# Patient Record
Sex: MALE
[De-identification: names, ages, dates, MRNs, and addresses within clinical notes are randomized per-mention and may not be internally consistent; named-entity substitution may affect disease eponyms.]

---

## 2023-10-31 PROBLEM — Z00.00 ENCOUNTER FOR PREVENTIVE HEALTH EXAMINATION: Status: ACTIVE | Noted: 2023-10-31

## 2023-11-10 ENCOUNTER — APPOINTMENT (OUTPATIENT)
Dept: RADIATION ONCOLOGY | Facility: CLINIC | Age: 58
End: 2023-11-10
Payer: MEDICAID

## 2023-11-10 VITALS
BODY MASS INDEX: 46.65 KG/M2 | SYSTOLIC BLOOD PRESSURE: 132 MMHG | HEIGHT: 69 IN | DIASTOLIC BLOOD PRESSURE: 85 MMHG | OXYGEN SATURATION: 96 % | HEART RATE: 93 BPM | TEMPERATURE: 96.4 F | RESPIRATION RATE: 18 BRPM | WEIGHT: 315 LBS

## 2023-11-10 DIAGNOSIS — I10 ESSENTIAL (PRIMARY) HYPERTENSION: ICD-10-CM

## 2023-11-10 DIAGNOSIS — I48.91 UNSPECIFIED ATRIAL FIBRILLATION: ICD-10-CM

## 2023-11-10 DIAGNOSIS — Z92.21 PERSONAL HISTORY OF ANTINEOPLASTIC CHEMOTHERAPY: ICD-10-CM

## 2023-11-10 PROCEDURE — 99204 OFFICE O/P NEW MOD 45 MIN: CPT | Mod: GC,25

## 2023-11-10 RX ORDER — PROCHLORPERAZINE MALEATE 5 MG/1
5 TABLET ORAL
Refills: 0 | Status: ACTIVE | COMMUNITY
Start: 2023-11-10

## 2023-11-10 RX ORDER — CAPECITABINE 500 MG/1
500 TABLET ORAL
Refills: 0 | Status: ACTIVE | COMMUNITY
Start: 2023-11-10

## 2023-11-21 PROBLEM — I10 HYPERTENSION: Status: ACTIVE | Noted: 2023-11-10

## 2023-11-21 PROBLEM — I48.91 ATRIAL FIBRILLATION: Status: ACTIVE | Noted: 2023-11-21

## 2024-02-07 ENCOUNTER — OUTPATIENT (OUTPATIENT)
Dept: OUTPATIENT SERVICES | Facility: HOSPITAL | Age: 59
LOS: 1 days | Discharge: HOME | End: 2024-02-07
Payer: MEDICAID

## 2024-02-12 ENCOUNTER — APPOINTMENT (OUTPATIENT)
Dept: RADIATION ONCOLOGY | Facility: CLINIC | Age: 59
End: 2024-02-12
Payer: MEDICAID

## 2024-02-12 VITALS
HEIGHT: 69 IN | DIASTOLIC BLOOD PRESSURE: 81 MMHG | BODY MASS INDEX: 46.33 KG/M2 | TEMPERATURE: 95.9 F | OXYGEN SATURATION: 96 % | WEIGHT: 312.84 LBS | SYSTOLIC BLOOD PRESSURE: 121 MMHG | RESPIRATION RATE: 20 BRPM | HEART RATE: 105 BPM

## 2024-02-12 PROCEDURE — 99215 OFFICE O/P EST HI 40 MIN: CPT | Mod: 25,GC

## 2024-02-12 RX ORDER — LOSARTAN POTASSIUM AND HYDROCHLOROTHIAZIDE 25; 100 MG/1; MG/1
100-25 TABLET ORAL DAILY
Refills: 0 | Status: DISCONTINUED | COMMUNITY
Start: 2023-11-10 | End: 2024-02-12

## 2024-02-12 RX ORDER — APIXABAN 5 MG/1
5 TABLET, FILM COATED ORAL
Qty: 30 | Refills: 0 | Status: ACTIVE | COMMUNITY
Start: 2024-02-12

## 2024-02-12 NOTE — HISTORY OF PRESENT ILLNESS
[FreeTextEntry1] : Mr. Zohaib Valencia is a 57 yo M morbidly obese, Afib (on Eliquis), HTN and metastatic rectal adenocarcinoma with disease to lungs, atleast cM1a, Stage HUEY s/p Xeloda, Oxaliplatin and Avastin for 6 cycles at [facility] with positive response. Patient re-presents to discuss radiation therapy.  Brief Oncological History: He had circumferential 9cm rectal mass with extension into the anal canal on screening colonoscopy 5/18/23 initial pathology benign.  6/13/23 - CT c/A/P showed bibasilar lung nodules suspicious for metastases, rectal fullness concerning for underlying lesion and left inferior para-aortic lymph nodes.  6/16/2023 - MRI Pelvis confirmed large sessile mass of the rectum without transmural invasion and centrally necrotic mildly enlarged LNs and external iliac lymph node.  8/11/23 - PET CT showing low grade metabolic activity of the rectal lesion. Prominent mesorectal -2cm, right external iliac - 1.6cm, celiac LNs - 2.2cm, portal caval LNs - 1.cm. Multiple lung nodules, largest RLL, measuring 1.9x1.5cm.  8/30/23 - Colonoscopy showed a 9cm circumferential mass 0 to 9cm from the anal verge. Biopsies showing multiple superficial fragments of villotubular adenoma though superficial biopsies are likely not representative.  9/20/23 - RLL biopsy showing metastatic well differentiated adenocarcinoma with extensive necrosis, consistent with colonic primary. Her2 negative, MSH intact.  10/21/23 - CT C/A/P showing increased number of innumerable pulmonary nodules. Soft tissue rectal mass measuring 3.1 x 3.3 x 2.9cm.  11/9/23 - Another rectal biopsy noted invasive adenocarcinoma arising in tubulovillous adenoma, well to modertately dififerentiated.  1/30/24 - CT C/A/P showing decreased size of rectal mass and decreased size and number of lung nodules representing a positive response to chemotherapy. The RLL mass is now 1.3x1.2 cm (previously 1.7x1.8cm. Rectal mass previousy 3.3cm now 2.5cm - limited LN response.  Per outside documentation from [facility], has completed 6 cycles of Xeloda 2500mg BID x14 days, Oxaliplatin 130mg IV and Avastin 7/5mg IV [id number]. Was seen at [facility] on 11/10, but at the time given extent of metastatic disease, discussed chemotherapy first.   2/12/23 - Today for re-evaluation. He is doing well, tolerating chemotherapy with neuropathy noted. He notes fatigue from chemotherapy, and no appetite, has lost 40 pounds since starting he notes. Overall taking 5-6 Ensures daily and trying to hydrate as much as possible. Daily soft normal BM, no diarrhea or constipation, no blood. No new SOB/ CP. Noted 1 episode of sudden onsent SOB that self resolved and recent fall 3 weeks ago without LOC, or trauma.

## 2024-02-12 NOTE — HISTORY OF PRESENT ILLNESS
[FreeTextEntry1] : Mr. Zohaib Valencia is a 59 yo M morbidly obese, Afib (on Eliquis), HTN and metastatic rectal adenocarcinoma with disease to lungs, atleast cM1a, Stage HUEY s/p Xeloda, Oxaliplatin and Avastin for 6 cycles at [facility] with positive response. Patient re-presents to discuss radiation therapy.  Brief Oncological History: He had circumferential 9cm rectal mass with extension into the anal canal on screening colonoscopy 5/18/23 initial pathology benign.  6/13/23 - CT c/A/P showed bibasilar lung nodules suspicious for metastases, rectal fullness concerning for underlying lesion and left inferior para-aortic lymph nodes.  6/16/2023 - MRI Pelvis confirmed large sessile mass of the rectum without transmural invasion and centrally necrotic mildly enlarged LNs and external iliac lymph node.  8/11/23 - PET CT showing low grade metabolic activity of the rectal lesion. Prominent mesorectal -2cm, right external iliac - 1.6cm, celiac LNs - 2.2cm, portal caval LNs - 1.cm. Multiple lung nodules, largest RLL, measuring 1.9x1.5cm.  8/30/23 - Colonoscopy showed a 9cm circumferential mass 0 to 9cm from the anal verge. Biopsies showing multiple superficial fragments of villotubular adenoma though superficial biopsies are likely not representative.  9/20/23 - RLL biopsy showing metastatic well differentiated adenocarcinoma with extensive necrosis, consistent with colonic primary. Her2 negative, MSH intact.  10/21/23 - CT C/A/P showing increased number of innumerable pulmonary nodules. Soft tissue rectal mass measuring 3.1 x 3.3 x 2.9cm.  11/9/23 - Another rectal biopsy noted invasive adenocarcinoma arising in tubulovillous adenoma, well to modertately dififerentiated.  1/30/24 - CT C/A/P showing decreased size of rectal mass and decreased size and number of lung nodules representing a positive response to chemotherapy. The RLL mass is now 1.3x1.2 cm (previously 1.7x1.8cm. Rectal mass previousy 3.3cm now 2.5cm - limited LN response.  Per outside documentation from [facility], has completed 6 cycles of Xeloda 2500mg BID x14 days, Oxaliplatin 130mg IV and Avastin 7/5mg IV [id number]. Was seen at [facility] on 11/10, but at the time given extent of metastatic disease, discussed chemotherapy first.   2/12/23 - Today for re-evaluation. He is doing well, tolerating chemotherapy with neuropathy noted. He notes fatigue from chemotherapy, and no appetite, has lost 40 pounds since starting he notes. Overall taking 5-6 Ensures daily and trying to hydrate as much as possible. Daily soft normal BM, no diarrhea or constipation, no blood. No new SOB/ CP. Noted 1 episode of sudden onsent SOB that self resolved and recent fall 3 weeks ago without LOC, or trauma.

## 2024-02-12 NOTE — PHYSICAL EXAM
[Sclera] : the sclera and conjunctiva were normal [Outer Ear] : the ears and nose were normal in appearance [] : no respiratory distress [Heart Rate And Rhythm] : heart rate and rhythm were normal [Nondistended] : nondistended [Abdomen Tenderness] : non-tender [Normal] : no palpable adenopathy [Musculoskeletal - Swelling] : no joint swelling [No Focal Deficits] : no focal deficits [Skin Color & Pigmentation] : normal skin color and pigmentation [Oriented To Time, Place, And Person] : oriented to person, place, and time

## 2024-02-12 NOTE — REVIEW OF SYSTEMS
[Negative] : Gastrointestinal [Fever] : no fever [Chills] : no chills [Chest Pain] : no chest pain [Palpitations] : no palpitations [Abdominal Pain] : no abdominal pain [Vomiting] : no vomiting [Constipation] : no constipation [Diarrhea] : no diarrhea [de-identified] : Neuropathy leading to fall per aptient.

## 2024-02-12 NOTE — REVIEW OF SYSTEMS
[Negative] : Gastrointestinal [Fever] : no fever [Chills] : no chills [Chest Pain] : no chest pain [Palpitations] : no palpitations [Abdominal Pain] : no abdominal pain [Vomiting] : no vomiting [Constipation] : no constipation [Diarrhea] : no diarrhea [de-identified] : Neuropathy leading to fall per aptient.

## 2024-02-12 NOTE — PHYSICAL EXAM
[Sclera] : the sclera and conjunctiva were normal [Outer Ear] : the ears and nose were normal in appearance [] : no respiratory distress [Heart Rate And Rhythm] : heart rate and rhythm were normal [Nondistended] : nondistended [Abdomen Tenderness] : non-tender [Normal] : no palpable adenopathy [Musculoskeletal - Swelling] : no joint swelling [Skin Color & Pigmentation] : normal skin color and pigmentation [No Focal Deficits] : no focal deficits [Oriented To Time, Place, And Person] : oriented to person, place, and time

## 2024-03-01 ENCOUNTER — NON-APPOINTMENT (OUTPATIENT)
Age: 59
End: 2024-03-01

## 2024-04-22 ENCOUNTER — APPOINTMENT (OUTPATIENT)
Dept: RADIATION ONCOLOGY | Facility: CLINIC | Age: 59
End: 2024-04-22
Payer: MEDICAID

## 2024-04-22 VITALS
SYSTOLIC BLOOD PRESSURE: 133 MMHG | HEART RATE: 88 BPM | RESPIRATION RATE: 20 BRPM | BODY MASS INDEX: 43.41 KG/M2 | TEMPERATURE: 95.9 F | WEIGHT: 293.98 LBS | DIASTOLIC BLOOD PRESSURE: 87 MMHG | OXYGEN SATURATION: 96 %

## 2024-04-22 DIAGNOSIS — R91.8 OTHER NONSPECIFIC ABNORMAL FINDING OF LUNG FIELD: ICD-10-CM

## 2024-04-22 PROCEDURE — 99215 OFFICE O/P EST HI 40 MIN: CPT | Mod: 25

## 2024-04-25 NOTE — PHYSICAL EXAM
[General Appearance - Alert] : alert [General Appearance - In No Acute Distress] : in no acute distress [Obese] : obese [Sclera] : the sclera and conjunctiva were normal [Extraocular Movements] : extraocular movements were intact [Outer Ear] : the ears and nose were normal in appearance [Hearing Threshold Finger Rub Not [place]] : hearing was normal [] : no respiratory distress [Exaggerated Use Of Accessory Muscles For Inspiration] : no accessory muscle use [Arterial Pulses Normal] : the arterial pulses were normal [Edema] : no peripheral edema present [Abdomen Soft] : soft [Nondistended] : nondistended [Abdomen Tenderness] : non-tender [Nail Clubbing] : no clubbing  or cyanosis of the fingernails [Normal] : oriented to person, place and time, the affect was normal, the mood was normal and not anxious

## 2024-05-07 DIAGNOSIS — C20 MALIGNANT NEOPLASM OF RECTUM: ICD-10-CM

## 2024-05-08 ENCOUNTER — APPOINTMENT (OUTPATIENT)
Dept: RADIATION ONCOLOGY | Facility: CLINIC | Age: 59
End: 2024-05-08
Payer: MEDICAID

## 2024-05-08 ENCOUNTER — NON-APPOINTMENT (OUTPATIENT)
Age: 59
End: 2024-05-08

## 2024-05-08 VITALS
OXYGEN SATURATION: 94 % | SYSTOLIC BLOOD PRESSURE: 144 MMHG | WEIGHT: 295.97 LBS | DIASTOLIC BLOOD PRESSURE: 98 MMHG | HEART RATE: 82 BPM | RESPIRATION RATE: 16 BRPM | BODY MASS INDEX: 43.71 KG/M2 | TEMPERATURE: 97.2 F

## 2024-05-08 DIAGNOSIS — C20 MALIGNANT NEOPLASM OF RECTUM: ICD-10-CM

## 2024-05-08 PROCEDURE — 77470 SPECIAL RADIATION TREATMENT: CPT | Mod: 26

## 2024-05-08 PROCEDURE — 77263 THER RADIOLOGY TX PLNG CPLX: CPT

## 2024-05-08 PROCEDURE — 77290 THER RAD SIMULAJ FIELD CPLX: CPT | Mod: 26

## 2024-05-08 PROCEDURE — 99213 OFFICE O/P EST LOW 20 MIN: CPT | Mod: GC

## 2024-05-08 PROCEDURE — 77333 RADIATION TREATMENT AID(S): CPT | Mod: 26

## 2024-05-08 NOTE — REVIEW OF SYSTEMS
[Anal Pain: Grade 0] : Anal Pain: Grade 0 [Diarrhea: Grade 0] : Diarrhea: Grade 0 [Fecal Incontinence: Grade 0] : Fecal Incontinence: Grade 0 [Nausea: Grade 0] : Nausea: Grade 0 [Rectal Pain: Grade 0] : Rectal Pain: Grade 0 [Vomiting: Grade 0] : Vomiting: Grade 0 [Urinary Frequency: Grade 1 - Present] : Urinary Frequency: Grade 1 - Present [Peripheral Sensory Neuropathy: Grade 1 - Asymptomatic; loss of deep tendon reflexes or paresthesia] : Peripheral Sensory Neuropathy: Grade 1 - Asymptomatic; loss of deep tendon reflexes or paresthesia [Cough: Grade 0] : Cough: Grade 0 [Dyspnea: Grade 1 - Shortness of breath with moderate exertion] : Dyspnea: Grade 1 - Shortness of breath with moderate exertion [FreeTextEntry9] : baseline [de-identified] : with chemo

## 2024-05-08 NOTE — HISTORY OF PRESENT ILLNESS
[FreeTextEntry1] : Mr. Zohaib Valencia is a 57 yo M morbidly obese, Afib (on Eliquis), HTN and metastatic rectal adenocarcinoma with disease to lungs, at least cM1a, Stage HUEY s/p Xeloda, Oxaliplatin and Avastin for 6 cycles at [facility] with positive response. Patient re-presents to discuss radiation therapy.  He had circumferential 9cm rectal mass with extension into the anal canal on screening colonoscopy 5/18/23 initial pathology benign.  6/13/23 - CT c/A/P showed bibasilar lung nodules suspicious for metastases, rectal fullness concerning for underlying lesion and left inferior para-aortic lymph nodes.  6/16/2023 - MRI Pelvis confirmed large sessile mass of the rectum without transmural invasion and centrally necrotic mildly enlarged LNs and external iliac lymph node.  8/11/23 - PET CT showing low grade metabolic activity of the rectal lesion. Prominent mesorectal -2cm, right external iliac - 1.6cm, celiac LNs - 2.2cm, portal caval LNs - 1.cm. Multiple lung nodules, largest RLL, measuring 1.9x1.5cm.  8/30/23 - Colonoscopy showed a 9cm circumferential mass 0 to 9cm from the anal verge. Biopsies showing multiple superficial fragments of villotubular adenoma though superficial biopsies are likely not representative.  9/20/23 - RLL biopsy showing metastatic well differentiated adenocarcinoma with extensive necrosis, consistent with colonic primary. Her2 negative, MSH intact.  10/21/23 - CT C/A/P showing increased number of innumerable pulmonary nodules. Soft tissue rectal mass measuring 3.1 x 3.3 x 2.9cm.  11/9/23 - Another rectal biopsy noted invasive adenocarcinoma arising in tubulovillous adenoma, well to modertately dififerentiated.  1/30/24 - CT C/A/P showing decreased size of rectal mass and decreased size and number of lung nodules representing a positive response to chemotherapy. The RLL mass is now 1.3x1.2 cm (previously 1.7x1.8cm. Rectal mass previousy 3.3cm now 2.5cm - limited LN response.  Per outside documentation from [facility], has completed 6 cycles of Xeloda 2500mg BID x14 days, Oxaliplatin 130mg IV and Avastin 7/5mg IV [id number]. Was seen at [facility] on 11/10, but at the time given extent of metastatic disease, discussed chemotherapy first.  2/12/23 re-evaluation. Doing well, tolerating chemotherapy with neuropathy noted. He notes fatigue from chemotherapy, and no appetite, has lost 40 pounds since starting he notes.   PET-CT performed on 4/3/2024 IMPRESSION: 1.There are non-FDG avid bilateral pulmonary nodules described as in report * there are RLLL nodules as described above. There is a 0.4 cm YESSICA nodule without FDG activity. The major central bronchi are patent.  2. There is focal intense FDG activity at the rectum. Direct visualization is recommened for further evaluating to exclude malignancy oif clinically indicated.   4/22/24 follow up: patient has since completed 12 cycles of chemo, tolerated well. He is having some neuropathy and complains only of fatigue and fecal urgency. Here to discuss next steps in his treatment.  Disc of MRI/PET brought by patient.

## 2024-05-21 PROBLEM — C20 RECTAL CANCER: Status: ACTIVE | Noted: 2023-11-21

## 2024-05-21 NOTE — REVIEW OF SYSTEMS
[Anal Pain: Grade 0] : Anal Pain: Grade 0 [Diarrhea: Grade 0] : Diarrhea: Grade 0 [Fecal Incontinence: Grade 0] : Fecal Incontinence: Grade 0 [Nausea: Grade 0] : Nausea: Grade 0 [Rectal Pain: Grade 0] : Rectal Pain: Grade 0 [Vomiting: Grade 0] : Vomiting: Grade 0 [Urinary Frequency: Grade 1 - Present] : Urinary Frequency: Grade 1 - Present [Peripheral Sensory Neuropathy: Grade 1 - Asymptomatic; loss of deep tendon reflexes or paresthesia] : Peripheral Sensory Neuropathy: Grade 1 - Asymptomatic; loss of deep tendon reflexes or paresthesia [Cough: Grade 0] : Cough: Grade 0 [Dyspnea: Grade 1 - Shortness of breath with moderate exertion] : Dyspnea: Grade 1 - Shortness of breath with moderate exertion [FreeTextEntry9] : baseline [de-identified] : with chemo

## 2024-05-21 NOTE — HISTORY OF PRESENT ILLNESS
[FreeTextEntry1] : Mr. Zohaib Valencia is a 57 yo M morbidly obese, Afib (on Eliquis), HTN and metastatic rectal adenocarcinoma with disease to lungs, at least cM1a, Stage HUEY s/p Xeloda, Oxaliplatin and Avastin for 12 cycles at [facility].  He seems to have had a positive response to chemotherapy with a near clinical CR. Patient was spoken to in regard to his treatment options which include surgery and chemoRT. The patient remains denying surgical intervention, especially if he would require ostomy. At this time, given that the patient is denying surgery we would recommend long course RT with concurrent Xeloda for improved local control.  We plan to reach out to his medical oncologist and surgeon for consensus prior to proceeding with treatment. In the meantime will set patient up for Sim and plan for a follow up phone call next week to discuss the plan.  5/8/2024: Patient presents to clinic for follow-up.  Saw his surgeon and agrees with plan for radiation therapy to pelvis.  Presents today to finalize next steps.  No new c/o.

## 2024-06-03 ENCOUNTER — RESULT REVIEW (OUTPATIENT)
Age: 59
End: 2024-06-03

## 2024-06-03 LAB — SURGICAL PATHOLOGY STUDY: SIGNIFICANT CHANGE UP

## 2024-06-03 PROCEDURE — 88321 CONSLTJ&REPRT SLD PREP ELSWR: CPT

## 2024-06-04 PROCEDURE — 77300 RADIATION THERAPY DOSE PLAN: CPT | Mod: 26

## 2024-06-04 PROCEDURE — 77334 RADIATION TREATMENT AID(S): CPT | Mod: 26

## 2024-06-04 PROCEDURE — 77295 3-D RADIOTHERAPY PLAN: CPT | Mod: 26

## 2024-06-07 PROCEDURE — 77280 THER RAD SIMULAJ FIELD SMPL: CPT | Mod: 26

## 2024-06-10 PROCEDURE — 77427 RADIATION TX MANAGEMENT X5: CPT

## 2024-06-13 ENCOUNTER — NON-APPOINTMENT (OUTPATIENT)
Age: 59
End: 2024-06-13

## 2024-06-13 VITALS
HEART RATE: 77 BPM | TEMPERATURE: 97.2 F | RESPIRATION RATE: 16 BRPM | HEIGHT: 69 IN | BODY MASS INDEX: 43.44 KG/M2 | OXYGEN SATURATION: 96 % | SYSTOLIC BLOOD PRESSURE: 158 MMHG | DIASTOLIC BLOOD PRESSURE: 97 MMHG | WEIGHT: 293.28 LBS

## 2024-06-13 NOTE — PHYSICAL EXAM
[General Appearance - Alert] : alert [General Appearance - In No Acute Distress] : in no acute distress [] : no respiratory distress [Respiration, Rhythm And Depth] : normal respiratory rhythm and effort [Exaggerated Use Of Accessory Muscles For Inspiration] : no accessory muscle use [Abdomen Soft] : soft [Nondistended] : nondistended [Nail Clubbing] : no clubbing  or cyanosis of the fingernails

## 2024-06-13 NOTE — REVIEW OF SYSTEMS
[Anal Pain: Grade 0] : Anal Pain: Grade 0 [Constipation: Grade 0] : Constipation: Grade 0 [Diarrhea: Grade 0] : Diarrhea: Grade 0 [Fecal Incontinence: Grade 0] : Fecal Incontinence: Grade 0 [Rectal Pain: Grade 0] : Rectal Pain: Grade 0 [Vomiting: Grade 0] : Vomiting: Grade 0 [Fatigue: Grade 1 - Fatigue relieved by rest] : Fatigue: Grade 1 - Fatigue relieved by rest [Hematuria: Grade 0] : Hematuria: Grade 0 [Urinary Incontinence: Grade 0] : Urinary Incontinence: Grade 0  [Urinary Urgency: Grade 0] : Urinary Urgency: Grade 0 [Urinary Frequency: Grade 0] : Urinary Frequency: Grade 0 [Cough: Grade 0] : Cough: Grade 0 [Dyspnea: Grade 0] : Dyspnea: Grade 0

## 2024-06-26 NOTE — HISTORY OF PRESENT ILLNESS
[FreeTextEntry1] : Mr. Zohaib Valencia is a 57 yo M morbidly obese, Afib (on Eliquis), HTN and metastatic rectal adenocarcinoma with disease to lungs, at least cM1a, Stage HUEY s/p Xeloda, Oxaliplatin and Avastin for 12 cycles at [facility].  He seems to have had a positive response to chemotherapy with a near clinical CR. Patient was spoken to in regard to his treatment options which include surgery and chemoRT. The patient remains denying surgical intervention, especially if he would require ostomy. At this time, given that the patient is denying surgery we would recommend long course RT with concurrent Xeloda for improved local control.  We plan to reach out to his medical oncologist and surgeon for consensus prior to proceeding with treatment. In the meantime will set patient up for Sim and plan for a follow up phone call next week to discuss the plan.  5/8/2024: Patient presents to clinic for follow-up.  Saw his surgeon and agrees with plan for radiation therapy to pelvis.  Presents today to finalize next steps.  No new c/o.  6/13/2024: Patient presents to clinic for OTV s/p 4/5 fx of 2500 cGy to the rectum/pelvis. He is overall tolerating treatment well. Stool is of normal consistency. Robert any constipation or diarrhea.

## 2024-06-26 NOTE — DISEASE MANAGEMENT
[Pathological] : TNM Stage: p [IV] : IV [TTNM] : x [NTNM] : x [MTNM] : 1 [de-identified] : 2000 cGy  [de-identified] : 2500 cGy [de-identified] : rectum/pelvis

## 2024-08-01 ENCOUNTER — APPOINTMENT (OUTPATIENT)
Dept: RADIATION ONCOLOGY | Facility: CLINIC | Age: 59
End: 2024-08-01
Payer: MEDICAID

## 2024-08-01 VITALS
RESPIRATION RATE: 18 BRPM | OXYGEN SATURATION: 95 % | HEIGHT: 69 IN | SYSTOLIC BLOOD PRESSURE: 160 MMHG | WEIGHT: 305 LBS | TEMPERATURE: 97 F | BODY MASS INDEX: 45.18 KG/M2 | HEART RATE: 75 BPM | DIASTOLIC BLOOD PRESSURE: 87 MMHG

## 2024-08-01 DIAGNOSIS — C20 MALIGNANT NEOPLASM OF RECTUM: ICD-10-CM

## 2024-08-01 PROCEDURE — 99213 OFFICE O/P EST LOW 20 MIN: CPT

## 2024-08-01 NOTE — HISTORY OF PRESENT ILLNESS
[FreeTextEntry1] : Mr. Zohaib Valencia is a 57 yo M morbidly obese, Afib (on Eliquis), HTN and metastatic rectal adenocarcinoma with disease to lungs, at least cM1a, Stage HUEY s/p Xeloda, Oxaliplatin and Avastin for 12 cycles at [facility].  He seems to have had a positive response to chemotherapy with a near clinical CR. Patient was spoken to in regard to his treatment options which include surgery and chemoRT. The patient remains denying surgical intervention, especially if he would require ostomy. At this time, given that the patient is denying surgery we would recommend long course RT with concurrent Xeloda for improved local control. His insurance denied long-course radiation, and hence we delivered short course radiation, 25 Gy in 5 fx from 6/10/2024-6/14/2024.  he now returns for follow-up.  8/1/2024: Patient presents to clinic for follow-up he is s/p RT to the rectum/pelvis completed on 6/14/2024. Past treatment mild rectal burning, states resolved within 3 days and not troublesome. Denies bleeding/mucous per rectum/ Denies incontinence. Feels has increased gassiness.  Having1 bowel movement per day describes as regular consistency.  Continues on Xeloda 14 days on 7 days off and on again.    He is switching care from [facility] to [facility].  Dr. Boateng oncologist appt in 2 weeks  and Dr. Malissa Mccabe for colorectal surgery.   Recent CT C/A/P with stable findings.

## 2024-08-01 NOTE — REVIEW OF SYSTEMS
[Anal Pain: Grade 0] : Anal Pain: Grade 0 [Constipation: Grade 0] : Constipation: Grade 0 [Diarrhea: Grade 0] : Diarrhea: Grade 0 [Fecal Incontinence: Grade 0] : Fecal Incontinence: Grade 0 [Rectal Pain: Grade 0] : Rectal Pain: Grade 0 [Vomiting: Grade 0] : Vomiting: Grade 0 [Fatigue: Grade 1 - Fatigue relieved by rest] : Fatigue: Grade 1 - Fatigue relieved by rest [Hematuria: Grade 0] : Hematuria: Grade 0 [Urinary Incontinence: Grade 0] : Urinary Incontinence: Grade 0  [Urinary Urgency: Grade 0] : Urinary Urgency: Grade 0 [Urinary Frequency: Grade 0] : Urinary Frequency: Grade 0 [Cough: Grade 0] : Cough: Grade 0 [Dyspnea: Grade 0] : Dyspnea: Grade 0 [Proctitis: Grade 0] : Proctitis: Grade 0 [Urinary Tract Pain: Grade 0] : Urinary Tract Pain: Grade 0

## 2024-11-06 ENCOUNTER — APPOINTMENT (OUTPATIENT)
Dept: RADIATION ONCOLOGY | Facility: CLINIC | Age: 59
End: 2024-11-06

## 2024-11-06 PROCEDURE — 99213 OFFICE O/P EST LOW 20 MIN: CPT | Mod: GC

## 2025-05-07 ENCOUNTER — APPOINTMENT (OUTPATIENT)
Dept: RADIATION ONCOLOGY | Facility: CLINIC | Age: 60
End: 2025-05-07
Payer: MEDICAID

## 2025-05-07 VITALS
HEIGHT: 69 IN | OXYGEN SATURATION: 100 % | TEMPERATURE: 97.7 F | HEART RATE: 88 BPM | SYSTOLIC BLOOD PRESSURE: 119 MMHG | BODY MASS INDEX: 46.65 KG/M2 | DIASTOLIC BLOOD PRESSURE: 76 MMHG | WEIGHT: 315 LBS | RESPIRATION RATE: 18 BRPM

## 2025-05-07 DIAGNOSIS — C20 MALIGNANT NEOPLASM OF RECTUM: ICD-10-CM

## 2025-05-07 DIAGNOSIS — R91.8 OTHER NONSPECIFIC ABNORMAL FINDING OF LUNG FIELD: ICD-10-CM

## 2025-05-07 PROCEDURE — 99213 OFFICE O/P EST LOW 20 MIN: CPT
